# Patient Record
Sex: MALE | Race: WHITE | NOT HISPANIC OR LATINO | Employment: FULL TIME | URBAN - METROPOLITAN AREA
[De-identification: names, ages, dates, MRNs, and addresses within clinical notes are randomized per-mention and may not be internally consistent; named-entity substitution may affect disease eponyms.]

---

## 2023-03-10 PROBLEM — H57.12 OCULAR PAIN, LEFT EYE: Status: ACTIVE | Noted: 2022-07-16

## 2024-05-31 ENCOUNTER — HOSPITAL ENCOUNTER (EMERGENCY)
Facility: HOSPITAL | Age: 54
Discharge: HOME | End: 2024-05-31
Attending: EMERGENCY MEDICINE
Payer: OTHER GOVERNMENT

## 2024-05-31 VITALS
HEART RATE: 77 BPM | BODY MASS INDEX: 29.87 KG/M2 | HEIGHT: 65 IN | OXYGEN SATURATION: 97 % | DIASTOLIC BLOOD PRESSURE: 90 MMHG | SYSTOLIC BLOOD PRESSURE: 147 MMHG | RESPIRATION RATE: 23 BRPM | TEMPERATURE: 99 F | WEIGHT: 179.25 LBS

## 2024-05-31 DIAGNOSIS — Z91.148 NONCOMPLIANCE WITH DIET AND MEDICATION REGIMEN: ICD-10-CM

## 2024-05-31 DIAGNOSIS — Z91.199 NONCOMPLIANCE WITH DIET AND MEDICATION REGIMEN: ICD-10-CM

## 2024-05-31 DIAGNOSIS — R07.9 CHEST PAIN: ICD-10-CM

## 2024-05-31 DIAGNOSIS — I10 UNCONTROLLED HYPERTENSION: Primary | ICD-10-CM

## 2024-05-31 DIAGNOSIS — R53.1 WEAKNESS: ICD-10-CM

## 2024-05-31 DIAGNOSIS — I10 PRIMARY HYPERTENSION: ICD-10-CM

## 2024-05-31 LAB
ALBUMIN SERPL BCP-MCNC: 4.3 G/DL (ref 3.5–5.2)
ALP SERPL-CCNC: 112 U/L (ref 55–135)
ALT SERPL W/O P-5'-P-CCNC: 35 U/L (ref 10–44)
AMPHET+METHAMPHET UR QL: ABNORMAL
ANION GAP SERPL CALC-SCNC: 11 MMOL/L (ref 8–16)
AST SERPL-CCNC: 29 U/L (ref 10–40)
BARBITURATES UR QL SCN>200 NG/ML: NEGATIVE
BASOPHILS # BLD AUTO: 0.04 K/UL (ref 0–0.2)
BASOPHILS NFR BLD: 0.4 % (ref 0–1.9)
BENZODIAZ UR QL SCN>200 NG/ML: NEGATIVE
BILIRUB SERPL-MCNC: 0.5 MG/DL (ref 0.1–1)
BILIRUB UR QL STRIP: NEGATIVE
BUN SERPL-MCNC: 15 MG/DL (ref 6–20)
BZE UR QL SCN: NEGATIVE
CALCIUM SERPL-MCNC: 9.5 MG/DL (ref 8.7–10.5)
CANNABINOIDS UR QL SCN: NEGATIVE
CHLORIDE SERPL-SCNC: 102 MMOL/L (ref 95–110)
CLARITY UR: CLEAR
CO2 SERPL-SCNC: 24 MMOL/L (ref 23–29)
COLOR UR: YELLOW
CREAT SERPL-MCNC: 0.9 MG/DL (ref 0.5–1.4)
CREAT UR-MCNC: 185.5 MG/DL (ref 23–375)
DIFFERENTIAL METHOD BLD: ABNORMAL
EOSINOPHIL # BLD AUTO: 0.2 K/UL (ref 0–0.5)
EOSINOPHIL NFR BLD: 2.3 % (ref 0–8)
ERYTHROCYTE [DISTWIDTH] IN BLOOD BY AUTOMATED COUNT: 13.9 % (ref 11.5–14.5)
EST. GFR  (NO RACE VARIABLE): >60 ML/MIN/1.73 M^2
GLUCOSE SERPL-MCNC: 90 MG/DL (ref 70–110)
GLUCOSE UR QL STRIP: NEGATIVE
HCT VFR BLD AUTO: 42.5 % (ref 40–54)
HGB BLD-MCNC: 14.4 G/DL (ref 14–18)
HGB UR QL STRIP: NEGATIVE
IMM GRANULOCYTES # BLD AUTO: 0.03 K/UL (ref 0–0.04)
IMM GRANULOCYTES NFR BLD AUTO: 0.3 % (ref 0–0.5)
KETONES UR QL STRIP: NEGATIVE
LEUKOCYTE ESTERASE UR QL STRIP: NEGATIVE
LYMPHOCYTES # BLD AUTO: 1 K/UL (ref 1–4.8)
LYMPHOCYTES NFR BLD: 10.7 % (ref 18–48)
MCH RBC QN AUTO: 28.3 PG (ref 27–31)
MCHC RBC AUTO-ENTMCNC: 33.9 G/DL (ref 32–36)
MCV RBC AUTO: 84 FL (ref 82–98)
METHADONE UR QL SCN>300 NG/ML: NEGATIVE
MONOCYTES # BLD AUTO: 0.7 K/UL (ref 0.3–1)
MONOCYTES NFR BLD: 7.8 % (ref 4–15)
NEUTROPHILS # BLD AUTO: 7.3 K/UL (ref 1.8–7.7)
NEUTROPHILS NFR BLD: 78.5 % (ref 38–73)
NITRITE UR QL STRIP: NEGATIVE
NRBC BLD-RTO: 0 /100 WBC
OPIATES UR QL SCN: NEGATIVE
PCP UR QL SCN>25 NG/ML: NEGATIVE
PH UR STRIP: 5 [PH] (ref 5–8)
PLATELET # BLD AUTO: 238 K/UL (ref 150–450)
PMV BLD AUTO: 10.5 FL (ref 9.2–12.9)
POTASSIUM SERPL-SCNC: 3.9 MMOL/L (ref 3.5–5.1)
PROT SERPL-MCNC: 8.1 G/DL (ref 6–8.4)
PROT UR QL STRIP: ABNORMAL
RBC # BLD AUTO: 5.09 M/UL (ref 4.6–6.2)
SODIUM SERPL-SCNC: 137 MMOL/L (ref 136–145)
SP GR UR STRIP: 1.02 (ref 1–1.03)
TOXICOLOGY INFORMATION: ABNORMAL
TROPONIN I SERPL DL<=0.01 NG/ML-MCNC: 0.01 NG/ML (ref 0–0.03)
TROPONIN I SERPL DL<=0.01 NG/ML-MCNC: <0.006 NG/ML (ref 0–0.03)
URN SPEC COLLECT METH UR: ABNORMAL
UROBILINOGEN UR STRIP-ACNC: NEGATIVE EU/DL
WBC # BLD AUTO: 9.32 K/UL (ref 3.9–12.7)

## 2024-05-31 PROCEDURE — 63600175 PHARM REV CODE 636 W HCPCS: Performed by: EMERGENCY MEDICINE

## 2024-05-31 PROCEDURE — 81003 URINALYSIS AUTO W/O SCOPE: CPT | Mod: 59 | Performed by: EMERGENCY MEDICINE

## 2024-05-31 PROCEDURE — 99285 EMERGENCY DEPT VISIT HI MDM: CPT | Mod: 25

## 2024-05-31 PROCEDURE — 85025 COMPLETE CBC W/AUTO DIFF WBC: CPT | Performed by: NURSE PRACTITIONER

## 2024-05-31 PROCEDURE — 25000003 PHARM REV CODE 250: Performed by: EMERGENCY MEDICINE

## 2024-05-31 PROCEDURE — 93005 ELECTROCARDIOGRAM TRACING: CPT

## 2024-05-31 PROCEDURE — 80053 COMPREHEN METABOLIC PANEL: CPT | Performed by: NURSE PRACTITIONER

## 2024-05-31 PROCEDURE — 96374 THER/PROPH/DIAG INJ IV PUSH: CPT

## 2024-05-31 PROCEDURE — 93010 ELECTROCARDIOGRAM REPORT: CPT | Mod: ,,, | Performed by: STUDENT IN AN ORGANIZED HEALTH CARE EDUCATION/TRAINING PROGRAM

## 2024-05-31 PROCEDURE — 93010 ELECTROCARDIOGRAM REPORT: CPT | Mod: 76,,, | Performed by: STUDENT IN AN ORGANIZED HEALTH CARE EDUCATION/TRAINING PROGRAM

## 2024-05-31 PROCEDURE — 84484 ASSAY OF TROPONIN QUANT: CPT | Performed by: NURSE PRACTITIONER

## 2024-05-31 PROCEDURE — 96361 HYDRATE IV INFUSION ADD-ON: CPT

## 2024-05-31 PROCEDURE — 80307 DRUG TEST PRSMV CHEM ANLYZR: CPT | Performed by: EMERGENCY MEDICINE

## 2024-05-31 PROCEDURE — 96375 TX/PRO/DX INJ NEW DRUG ADDON: CPT

## 2024-05-31 PROCEDURE — 84484 ASSAY OF TROPONIN QUANT: CPT | Mod: 91 | Performed by: EMERGENCY MEDICINE

## 2024-05-31 RX ORDER — AMLODIPINE BESYLATE 10 MG/1
10 TABLET ORAL DAILY
Qty: 30 TABLET | Refills: 0 | Status: SHIPPED | OUTPATIENT
Start: 2024-05-31 | End: 2024-05-31

## 2024-05-31 RX ORDER — HYDRALAZINE HYDROCHLORIDE 20 MG/ML
20 INJECTION INTRAMUSCULAR; INTRAVENOUS
Status: COMPLETED | OUTPATIENT
Start: 2024-05-31 | End: 2024-05-31

## 2024-05-31 RX ORDER — KETOROLAC TROMETHAMINE 30 MG/ML
15 INJECTION, SOLUTION INTRAMUSCULAR; INTRAVENOUS
Status: COMPLETED | OUTPATIENT
Start: 2024-05-31 | End: 2024-05-31

## 2024-05-31 RX ORDER — CLONIDINE HYDROCHLORIDE 0.2 MG/1
0.2 TABLET ORAL
Status: DISCONTINUED | OUTPATIENT
Start: 2024-05-31 | End: 2024-05-31

## 2024-05-31 RX ORDER — AMLODIPINE BESYLATE 10 MG/1
10 TABLET ORAL DAILY
Qty: 30 TABLET | Refills: 0 | Status: SHIPPED | OUTPATIENT
Start: 2024-05-31 | End: 2025-05-31

## 2024-05-31 RX ADMIN — SODIUM CHLORIDE 1000 ML: 0.9 INJECTION, SOLUTION INTRAVENOUS at 04:05

## 2024-05-31 RX ADMIN — KETOROLAC TROMETHAMINE 15 MG: 30 INJECTION, SOLUTION INTRAMUSCULAR at 04:05

## 2024-05-31 RX ADMIN — HYDRALAZINE HYDROCHLORIDE 20 MG: 20 INJECTION, SOLUTION INTRAMUSCULAR; INTRAVENOUS at 03:05

## 2024-05-31 NOTE — ED PROVIDER NOTES
SCRIBE #1 NOTE: I, Jean Marie Holman, am scribing for, and in the presence of, Josey Parish MD. I have scribed the entire note.       History     Chief Complaint   Patient presents with    Hypertension     Pt reports elevated BP readings and blurred vision started today- pt reports not taking BP meds for 1 week    Shoulder Pain     Shoulder pain and neck pain reported x 1 week     Review of patient's allergies indicates:  No Known Allergies      History of Present Illness     HPI    5/31/2024, 3:16 PM  History obtained from the patient      History of Present Illness: Juan Manuel Barrientos is a 54 y.o. male patient with a PMHx of ADHD, HTN, and depression who presents to the Emergency Department for evaluation of elevated BP which onset gradually since pt stopped taking his BP meds for the past week. Pt does not check BP at home either. Symptoms are constant and moderate in severity. No mitigating or exacerbating factors reported. Associated sxs include dizziness, mild HA, and shoulder aching. Patient denies any SOB, fever, N/V, dysuria, and all other sxs at this time. No prior Tx. Pt takes NORVASC 5 mg daily. Pt denies drinking EtOH or smoking. Pt takes Adderall and Effexor. No further complaints or concerns at this time.       Arrival mode: Personal vehicle      PCP: No, Primary Doctor        Past Medical History:  Past Medical History:   Diagnosis Date    ADHD (attention deficit hyperactivity disorder)     Chicken pox     Depression     Hypertension        Past Surgical History:  Past Surgical History:   Procedure Laterality Date    LEFT HEART CATHETERIZATION           Family History:  Family History   Problem Relation Name Age of Onset    Kidney disease Mother      Hyperlipidemia Mother      Hypertension Mother      Stroke Father      Hypertension Father      Diabetes Father      Cancer Sister         Social History:  Social History     Tobacco Use    Smoking status: Never     Passive exposure: Past     Smokeless tobacco: Never   Substance and Sexual Activity    Alcohol use: Not on file    Drug use: Not on file    Sexual activity: Not on file        Review of Systems     Review of Systems   Constitutional:  Negative for appetite change, chills and fever.   HENT:  Negative for congestion and sore throat.    Respiratory:  Negative for cough and shortness of breath.    Cardiovascular:  Negative for chest pain.        (+) elevated BP   Gastrointestinal:  Negative for abdominal pain, nausea and vomiting.   Genitourinary:  Negative for dysuria.   Musculoskeletal:  Positive for arthralgias (aching shoulders). Negative for back pain.   Skin:  Negative for rash.   Neurological:  Positive for dizziness and headaches (mild). Negative for weakness.   Hematological:  Does not bruise/bleed easily.   All other systems reviewed and are negative.       Physical Exam     Initial Vitals [05/31/24 1418]   BP Pulse Resp Temp SpO2   (!) 199/124 86 18 98.9 °F (37.2 °C) 98 %      MAP       --          Physical Exam  Nursing Notes and Vital Signs Reviewed.  Constitutional: Patient is in no acute distress. Well-developed and well-nourished.  Head: Atraumatic. Normocephalic.  Eyes: PERRL. EOM intact. Conjunctivae are not pale. No scleral icterus.  ENT: Mucous membranes are moist. Oropharynx is clear and symmetric.    Neck: Supple. Full ROM. No lymphadenopathy.  Cardiovascular: Regular rate. Regular rhythm. No murmurs, rubs, or gallops. Distal pulses are 2+ and symmetric.  Pulmonary/Chest: No respiratory distress. Clear to auscultation bilaterally. No wheezing or rales.  Abdominal: Soft and non-distended.  There is no tenderness.  No rebound, guarding, or rigidity. Good bowel sounds.  Genitourinary: No CVA tenderness  Musculoskeletal: Moves all extremities. No obvious deformities. No edema. No calf tenderness.  Skin: Warm and dry.  Neurological:  Alert, awake, and appropriate.  Normal speech.  No acute focal neurological deficits are  "appreciated.  Psychiatric: Normal affect. Good eye contact. Appropriate in content.     ED Course   Critical Care    Date/Time: 5/31/2024 3:17 PM    Performed by: Josey Parish MD  Authorized by: Josey Parish MD  Direct patient critical care time: 16 minutes  Additional history critical care time: 12 minutes  Ordering / reviewing critical care time: 9 minutes  Documentation critical care time: 8 minutes  Consulting other physicians critical care time: 7 minutes  Total critical care time (exclusive of procedural time) : 52 minutes  Critical care time was exclusive of teaching time and separately billable procedures and treating other patients.  Critical care was necessary to treat or prevent imminent or life-threatening deterioration of the following conditions: Hypertensive Emergency.  Critical care was time spent personally by me on the following activities: blood draw for specimens, development of treatment plan with patient or surrogate, interpretation of cardiac output measurements, evaluation of patient's response to treatment, obtaining history from patient or surrogate, examination of patient, ordering and review of laboratory studies, ordering and performing treatments and interventions, pulse oximetry, ordering and review of radiographic studies, re-evaluation of patient's condition and review of old charts.        ED Vital Signs:  Vitals:    05/31/24 1418 05/31/24 1515 05/31/24 1530 05/31/24 1615   BP: (!) 199/124 (!) 199/115 (!) 156/87 (!) 81/45   Pulse: 86 77 87    Resp: 18 18 20    Temp: 98.9 °F (37.2 °C)      TempSrc: Oral      SpO2: 98% 99% 97%    Weight: 81.3 kg (179 lb 3.7 oz)      Height: 5' 5" (1.651 m)       05/31/24 1620 05/31/24 1625 05/31/24 1630 05/31/24 1635   BP: (!) 85/49 (!) 98/54 115/68 125/74   Pulse: 74 72 71 73   Resp: (!) 22 20 20 (!) 22   Temp:       TempSrc:       SpO2: 98% 98% 99% 100%   Weight:       Height:        05/31/24 1700 05/31/24 1730 05/31/24 1800 05/31/24 1830 "   BP: (!) 146/83 139/80 (!) 145/85 (!) 146/80   Pulse: 69 74 79 76   Resp: 13 20 (!) 23 (!) 22   Temp:       TempSrc:       SpO2: 99% 99% 98% 97%   Weight:       Height:        05/31/24 1845   BP: (!) 147/90   Pulse: 77   Resp: (!) 23   Temp:    TempSrc:    SpO2: 97%   Weight:    Height:        Abnormal Lab Results:  Labs Reviewed   CBC W/ AUTO DIFFERENTIAL - Abnormal; Notable for the following components:       Result Value    Gran % 78.5 (*)     Lymph % 10.7 (*)     All other components within normal limits   URINALYSIS, REFLEX TO URINE CULTURE - Abnormal; Notable for the following components:    Protein, UA Trace (*)     All other components within normal limits    Narrative:     Specimen Source->Urine   DRUG SCREEN PANEL, URINE EMERGENCY - Abnormal; Notable for the following components:    Amphetamine Screen, Ur Presumptive Positive (*)     All other components within normal limits    Narrative:     Specimen Source->Urine   COMPREHENSIVE METABOLIC PANEL   TROPONIN I   TROPONIN I        All Lab Results:  Results for orders placed or performed during the hospital encounter of 05/31/24   CBC auto differential   Result Value Ref Range    WBC 9.32 3.90 - 12.70 K/uL    RBC 5.09 4.60 - 6.20 M/uL    Hemoglobin 14.4 14.0 - 18.0 g/dL    Hematocrit 42.5 40.0 - 54.0 %    MCV 84 82 - 98 fL    MCH 28.3 27.0 - 31.0 pg    MCHC 33.9 32.0 - 36.0 g/dL    RDW 13.9 11.5 - 14.5 %    Platelets 238 150 - 450 K/uL    MPV 10.5 9.2 - 12.9 fL    Immature Granulocytes 0.3 0.0 - 0.5 %    Gran # (ANC) 7.3 1.8 - 7.7 K/uL    Immature Grans (Abs) 0.03 0.00 - 0.04 K/uL    Lymph # 1.0 1.0 - 4.8 K/uL    Mono # 0.7 0.3 - 1.0 K/uL    Eos # 0.2 0.0 - 0.5 K/uL    Baso # 0.04 0.00 - 0.20 K/uL    nRBC 0 0 /100 WBC    Gran % 78.5 (H) 38.0 - 73.0 %    Lymph % 10.7 (L) 18.0 - 48.0 %    Mono % 7.8 4.0 - 15.0 %    Eosinophil % 2.3 0.0 - 8.0 %    Basophil % 0.4 0.0 - 1.9 %    Differential Method Automated    Comprehensive metabolic panel   Result Value Ref  Range    Sodium 137 136 - 145 mmol/L    Potassium 3.9 3.5 - 5.1 mmol/L    Chloride 102 95 - 110 mmol/L    CO2 24 23 - 29 mmol/L    Glucose 90 70 - 110 mg/dL    BUN 15 6 - 20 mg/dL    Creatinine 0.9 0.5 - 1.4 mg/dL    Calcium 9.5 8.7 - 10.5 mg/dL    Total Protein 8.1 6.0 - 8.4 g/dL    Albumin 4.3 3.5 - 5.2 g/dL    Total Bilirubin 0.5 0.1 - 1.0 mg/dL    Alkaline Phosphatase 112 55 - 135 U/L    AST 29 10 - 40 U/L    ALT 35 10 - 44 U/L    eGFR >60 >60 mL/min/1.73 m^2    Anion Gap 11 8 - 16 mmol/L   Troponin I   Result Value Ref Range    Troponin I 0.007 0.000 - 0.026 ng/mL   Urinalysis, Reflex to Urine Culture Urine, Clean Catch    Specimen: Urine   Result Value Ref Range    Specimen UA Urine, Clean Catch     Color, UA Yellow Yellow, Straw, Diane    Appearance, UA Clear Clear    pH, UA 5.0 5.0 - 8.0    Specific Gravity, UA 1.025 1.005 - 1.030    Protein, UA Trace (A) Negative    Glucose, UA Negative Negative    Ketones, UA Negative Negative    Bilirubin (UA) Negative Negative    Occult Blood UA Negative Negative    Nitrite, UA Negative Negative    Urobilinogen, UA Negative <2.0 EU/dL    Leukocytes, UA Negative Negative   Drug screen panel, emergency   Result Value Ref Range    Benzodiazepines Negative Negative    Methadone metabolites Negative Negative    Cocaine (Metab.) Negative Negative    Opiate Scrn, Ur Negative Negative    Barbiturate Screen, Ur Negative Negative    Amphetamine Screen, Ur Presumptive Positive (A) Negative    THC Negative Negative    Phencyclidine Negative Negative    Creatinine, Urine 185.5 23.0 - 375.0 mg/dL    Toxicology Information SEE COMMENT    Troponin I   Result Value Ref Range    Troponin I <0.006 0.000 - 0.026 ng/mL   EKG 12-lead   Result Value Ref Range    QRS Duration 88 ms    [facility] QTC Calculation 448 ms   EKG 12-lead   Result Value Ref Range    QRS Duration 90 ms    [facility] QTC Calculation 475 ms         Imaging Results:  Imaging Results              CT Head Without Contrast (Final result)   Result time 05/31/24 16:15:06      Final result by Suly Gutierrez MD (05/31/24 16:15:06)                   Impression:      No acute abnormality.      Electronically signed by: Suly Gutierrez  Date:    05/31/2024  Time:    16:15               Narrative:    EXAMINATION:  CT HEAD WITHOUT CONTRAST    CLINICAL HISTORY:  Headache, new or worsening (Age >= 50y);    TECHNIQUE:  Low dose axial CT images obtained throughout the head without intravenous contrast. Sagittal and coronal reconstructions were performed.    COMPARISON:  None available    FINDINGS:  Intracranial compartment:    Ventricles and sulci are normal in size for age without evidence of hydrocephalus. No extra-axial blood or fluid collections.    The brain parenchyma appears normal. No parenchymal mass, hemorrhage, edema or major vascular distribution infarct.    Skull/extracranial contents (limited evaluation): No fracture. Mastoid air cells and paranasal sinuses are essentially clear.                                       X-Ray Chest 1 View (Final result)  Result time 05/31/24 15:20:19      Final result by Domenico Duque MD (05/31/24 15:20:19)                   Impression:      No acute process seen.      Electronically signed by: Domenico Duque MD  Date:    05/31/2024  Time:    15:20               Narrative:    EXAMINATION:  XR CHEST 1 VIEW    CLINICAL HISTORY:  Weakness    FINDINGS:  Single view of the chest.  Comparison 05/08/2012.  Study is limited by poor inspiration.    Cardiac silhouette is normal.  The lungs demonstrate no evidence of active disease.  No evidence of pleural effusion or pneumothorax.  Bones appear intact.  Moderate degenerative changes and moderate atherosclerotic disease.                                       The EKG was ordered, reviewed, and independently interpreted by the ED provider.  Interpretation time: 14:21  Rate: 82 BPM  Rhythm: normal sinus rhythm  Interpretation: Left axis deviation. Moderate voltage criteria  for LVH, may be normal variant ( R in a WL, [place] product). No STEMI.    The EKG was ordered, reviewed, and independently interpreted by the ED provider.  Interpretation time: 16:11  Rate: 75 BPM  Rhythm: normal sinus rhythm  Interpretation: Left ventricular hypertrophy with repolarization abnormality ( R in a VL, [place] Product). No STEMI.             The Emergency Provider reviewed the vital signs and test results, which are outlined above.     ED Discussion       6:41 PM: Reassessed pt at this time.  Discussed with pt all pertinent ED information and results. Discussed pt dx and plan of tx. Gave pt all f/u and return to the ED instructions. All questions and concerns were addressed at this time. Pt expresses understanding of information and instructions, and is comfortable with plan to discharge. Pt is stable for discharge.    I discussed with patient and/or family/caretaker that evaluation in the ED does not suggest any emergent or life threatening medical conditions requiring immediate intervention beyond what was provided in the ED, and I believe patient is safe for discharge.  Regardless, an unremarkable evaluation in the ED does not preclude the development or presence of a serious of life threatening condition. As such, patient was instructed to return immediately for any worsening or change in current symptoms.        Medical Decision Making  DDX: 1. HTN crisis 2. Stroke 3. ACS    ECG reviewed and no acute ischemic changes noted, CXR normal, CT head negative, lab work reviewed and wbc normal, cmp normal, troponin negative x 2, given iv hydralazine initially, patient then became hypotensive, Iv fluids given and BP normalized and patient felt back to baseline, admission initially considered but patient stabilized and was safe for discharge, will restart on BP meds, close pcp follow up    Amount and/or Complexity of Data Reviewed  Labs: ordered. Decision-making details documented in ED Course.  Radiology:  ordered. Decision-making details documented in ED Course.  ECG/medicine tests: ordered and independent interpretation performed. Decision-making details documented in ED Course.    Risk  Prescription drug management.  Decision regarding hospitalization.                ED Medication(s):  Medications   hydrALAZINE injection 20 mg (20 mg Intravenous Given 5/31/24 1522)   sodium chloride 0.9% bolus 1,000 mL 1,000 mL (0 mLs Intravenous Stopped 5/31/24 1750)   ketorolac injection 15 mg (15 mg Intravenous Given 5/31/24 1653)       Discharge Medication List as of 5/31/2024  6:42 PM           Follow-up Information       Local Primary Care Doctor. Schedule an appointment as soon as possible for a visit in 2 days.    Why: Return to the Emergency Room, If symptoms worsen                               Scribe Attestation:   Scribe #1: I performed the above scribed service and the documentation accurately describes the services I performed. I attest to the accuracy of the note.     Attending:   Physician Attestation Statement for Scribe #1: I, Josey Parish MD, personally performed the services described in this documentation, as scribed by Jean Marie Holman, in my presence, and it is both accurate and complete.           Clinical Impression       ICD-10-CM ICD-9-CM   1. Uncontrolled hypertension  I10 401.9   2. Weakness  R53.1 780.79   3. Chest pain  R07.9 786.50   4. Noncompliance with diet and medication regimen  Z91.199 V15.81    Z91.148    5. Primary hypertension  I10 401.9       Disposition:   Disposition: Discharged  Condition: Stable        Josey Parish MD  06/02/24 1440

## 2024-05-31 NOTE — FIRST PROVIDER EVALUATION
Medical screening examination initiated.  I have conducted a focused provider triage encounter, findings are as follows:    Brief history of present illness:   54-year-old male with complaint of feeling weak and lightheaded since this morning.  Patient also reports pain in bilateral trapezial areas since this morning.  Patient reports his blood pressure has been severely elevated he has been out of his blood pressure medication for days.  Patient denies chest pain.    There were no vitals filed for this visit.    Pertinent physical exam: alert, NAD    Brief workup plan: cardiac work up     Preliminary workup initiated; this workup will be continued and followed by the physician or advanced practice provider that is assigned to the patient when roomed.

## 2024-06-01 LAB
OHS QRS DURATION: 88 MS
OHS QTC CALCULATION: 448 MS

## 2024-06-02 LAB
OHS QRS DURATION: 90 MS
OHS QTC CALCULATION: 475 MS